# Patient Record
Sex: FEMALE | Race: WHITE | NOT HISPANIC OR LATINO | Employment: STUDENT | URBAN - METROPOLITAN AREA
[De-identification: names, ages, dates, MRNs, and addresses within clinical notes are randomized per-mention and may not be internally consistent; named-entity substitution may affect disease eponyms.]

---
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
## 2024-01-01 ENCOUNTER — TELEPHONE (OUTPATIENT)
Dept: ALLERGY | Facility: HOSPITAL | Age: 19
End: 2024-01-01
Payer: COMMERCIAL

## 2024-01-01 NOTE — TELEPHONE ENCOUNTER
RESULT INTERPRETATION NOTE  Emily's EBV DNA PCR is ow negative in both plasma and whole blood, so signs of clearing it, as previously positive for both in August 2023. Her IgG level continues to slowly drop, now down to 584 mg/dL, which again qualifies as hypogammaglobulinemia. If she continues having recurrent sinus or other infections we should re-discuss starting Emily on a trial of immunoglobulin (subcutaneous or IV) to assess her response and possible improvement. Same considerations as before for possible genetic testing.    Will communicate these results and interpretation with patient/family, through either [employer] message, telephone call, and/or by scheduling a follow-up visit to review these in detail.    (some labs below ordered by other specialties, A/I labs sent by us interpreted above)  Recent results  Lab on 12/28/2023   Component Date Value Ref Range Status    EBV DNA Result 12/28/2023 Not Detected  Not Detected Final    EBV PCR Plasma Log 12/28/2023    Final    EBV PCR Whole Blood LOG IU/mL 12/28/2023    Final    EBV Whole Blood DNA Result 12/28/2023 Not Detected  Not Detected Final    IgG 12/28/2023 584 (L)  700 - 1,600 mg/dL Final    IgA 12/28/2023 104  70 - 400 mg/dL Final    IgM 12/28/2023 60  40 - 230 mg/dL Final    Thyroid Stimulating Hormone 12/28/2023 1.10  0.44 - 3.98 mIU/L Final    Thyroxine, Free 12/28/2023 0.97  0.61 - 1.12 ng/dL Final    Thyroid Peroxidase (TPO) Antibody 12/28/2023 >1,000 (H)  <=60 IU/mL Final    Anti-Thyroglobulin AB 12/28/2023 <0.9  0.0 - 4.0 IU/mL Final

## 2024-01-12 ENCOUNTER — OFFICE VISIT (OUTPATIENT)
Dept: PEDIATRIC ENDOCRINOLOGY | Facility: CLINIC | Age: 19
End: 2024-01-12
Payer: COMMERCIAL

## 2024-01-12 VITALS
WEIGHT: 108.47 LBS | OXYGEN SATURATION: 98 % | TEMPERATURE: 98.1 F | RESPIRATION RATE: 19 BRPM | BODY MASS INDEX: 18.52 KG/M2 | DIASTOLIC BLOOD PRESSURE: 82 MMHG | SYSTOLIC BLOOD PRESSURE: 119 MMHG | HEART RATE: 86 BPM | HEIGHT: 64 IN

## 2024-01-12 DIAGNOSIS — E06.3 HASHIMOTO'S THYROIDITIS: Primary | ICD-10-CM

## 2024-01-12 PROCEDURE — 99214 OFFICE O/P EST MOD 30 MIN: CPT | Performed by: PEDIATRICS

## 2024-01-12 ASSESSMENT — ENCOUNTER SYMPTOMS
APPETITE CHANGE: 1
POLYDIPSIA: 0
HEADACHES: 1
POLYPHAGIA: 0
CONSTIPATION: 1

## 2024-01-12 NOTE — PATIENT INSTRUCTIONS
Emily thyroid nodule has resolved. Thyroid size is decreasing,   Continue selenomethionine at a dose of 200 mcg.        Plan:  1. Repeat thyroid ultrasound this summer  2. Please obtain labs this summer - please let me know before you get the lab so I can ask pathology to dilute it.  3. No need for thyroid hormone replacement therapy.     Follow-up this summer.

## 2024-01-12 NOTE — PROGRESS NOTES
Subjective   Emily Bains is a 18 y.o. female who presents for Hashimoto's Thyroiditis    HPI    Emily presents for follow-up endocrine evaluation for thyroid nodule. Last thyroid ultrasound in 10/22 with 0.4 cm nodule. Family reports prior thyroid US with no nodule in 6/20.     Emily has been noted to have TPO Ab elevation but negative TgAb. The ultrasound shows that thyroid less prominent. Emily still notes fullness of her thyroid. Emily has had TSH suppression in 10/19 at 0.2 and TSH elevation as high as 7.97 in 3/20. More recently TSH has been in 1 range, last in 10/22 and 12/2023. Emily was treated for short period of time with thyroid hormone supplement and that resulted in worsening of status and increased anxiety.     Emily is followed by naturopath, on low dose supplement lithium as well as many other supplements. She is gluten, dairy and egg free. She does not get iodinated salt at home but eats [employer] fries about once a week.     Emily with extreme fatigue. She has a history of apnea prior to tonsil and adenoid removal. Continue to feel like sleep does not result in feeling well rested.    She reports decrease in apetite  Worries a lot  - hard time to fall asleep. Gets 8hrs of sleep. Enjoyrs doing things. Good friends. Gets upset easily. Likes to read.   Off of SSRI. Sees counselor twice a week. Helps a bit.  Constipation. +acne - not had it before.   Periods are regular.        Initial evaluation was in 2019 - ?tytrophan TMG - +ve HHSV6 and high heavy metal. Lead is now to normal range.   On and off SSRI - not very successful   Migraine - tylenol or exedrin migraine.   Since 3/2020: gluten, egg, diary free   Folate, fish oil, tytrophan and mangesium      Hx of possible encephalitis - following w/ neurology at [facility]. Follow-up MRI in 5/2024.  Started Modafinil - starting 1/2 dose for 3 days then increase? .      Periods are regular. Denies polyuria/polydipsia      Studying marketing   Likes to  "read.      Lives with parents and brother.  Started selenium last time - taking it 30% of the time.     6.9 (R) 4.1 (L) --> thyroid [id number]    Review of Systems   Constitutional:  Positive for appetite change.   Gastrointestinal:  Positive for constipation.   Endocrine: Positive for cold intolerance. Negative for heat intolerance, polydipsia and polyphagia.   Neurological:  Positive for headaches.        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 Hashimoto's thyroitis was previously associated with mild right thyroid lobe enlargement and a subcentimeter (0.4 cm) mid-left thyroid nodule noted in 2020, which has since resolved. She has been taking selenomethionine 200 mcg daily since approximately August 2023, with observed improvement in right thyroid lobe enlargement. However, it is unclear whether this improvement is directly attributable to selenomethionine supplementation or occurred coincidentally over time.    Of note, Hashimoto's thyroiditis was initially diagnosed in 10/2019 when she was evaluated for anxiety and leg pain and was found to have transient hashitoxitosis with suppressed TSH, high fT3 and fT4, which subsequently normalize.     --> Repeat thyroid US and thyroid function tests this summer. We will likely discontinue selenium then.    --> Follow-up in 6mo    Patient " Instructions   Emily thyroid nodule has resolved. Thyroid size is decreasing,   Continue selenomethionine at a dose of 200 mcg.        Plan:  1. Repeat thyroid ultrasound this summer  2. Please obtain labs this summer - please let me know before you get the lab so I can ask pathology to dilute it.  3. No need for thyroid hormone replacement therapy.     Follow-up this summer.

## 2025-01-10 ENCOUNTER — LAB (OUTPATIENT)
Dept: LAB | Facility: LAB | Age: 20
End: 2025-01-10
Payer: COMMERCIAL

## 2025-01-10 ENCOUNTER — HOSPITAL ENCOUNTER (OUTPATIENT)
Dept: RADIOLOGY | Facility: HOSPITAL | Age: 20
Discharge: HOME | End: 2025-01-10
Payer: COMMERCIAL

## 2025-01-10 ENCOUNTER — APPOINTMENT (OUTPATIENT)
Dept: RADIOLOGY | Facility: CLINIC | Age: 20
End: 2025-01-10
Payer: COMMERCIAL

## 2025-01-10 DIAGNOSIS — E06.3 HASHIMOTO'S THYROIDITIS: ICD-10-CM

## 2025-01-10 LAB
T4 FREE SERPL-MCNC: 0.88 NG/DL (ref 0.61–1.12)
THYROPEROXIDASE AB SERPL-ACNC: >1000 IU/ML
TSH SERPL-ACNC: 1.14 MIU/L (ref 0.44–3.98)

## 2025-01-10 PROCEDURE — 84255 ASSAY OF SELENIUM: CPT

## 2025-01-10 PROCEDURE — 83036 HEMOGLOBIN GLYCOSYLATED A1C: CPT

## 2025-01-10 PROCEDURE — 76536 US EXAM OF HEAD AND NECK: CPT

## 2025-01-10 PROCEDURE — 86376 MICROSOMAL ANTIBODY EACH: CPT

## 2025-01-10 PROCEDURE — 86800 THYROGLOBULIN ANTIBODY: CPT

## 2025-01-10 PROCEDURE — 84439 ASSAY OF FREE THYROXINE: CPT

## 2025-01-10 PROCEDURE — 84443 ASSAY THYROID STIM HORMONE: CPT

## 2025-01-10 PROCEDURE — 36415 COLL VENOUS BLD VENIPUNCTURE: CPT

## 2025-01-11 LAB
EST. AVERAGE GLUCOSE BLD GHB EST-MCNC: 108 MG/DL
HBA1C MFR BLD: 5.4 %

## 2025-01-12 LAB
SELENIUM SERPL-MCNC: 145.6 UG/L (ref 23–190)
THYROGLOB AB SERPL-ACNC: <0.9 IU/ML (ref 0–4)

## 2025-04-08 ENCOUNTER — TELEPHONE (OUTPATIENT)
Dept: PEDIATRIC ENDOCRINOLOGY | Facility: CLINIC | Age: 20
End: 2025-04-08
Payer: COMMERCIAL

## 2025-04-08 NOTE — TELEPHONE ENCOUNTER
Called and LVM and kindly sent my chart messages.    Thyroid Ultrasound  The right thyroid lobe, which was previously enlarged in April 2023, is now normal in size.  Here are the detailed volume measurements over time:  4/2023: Right lobe - 8.2 mL  Left lobe - 4.4 mL ? Total: ~12.6 mL  12/2023: Right lobe - 6.9 mL  Left lobe - 4.1 mL ? Total: ~11 mL  1/2025: Right lobe - 5.5 mL  Left lobe - 3.5 mL ? Total: ~9 mL    Anti-TPO Antibody  With serial dilution, Emily's anti-TPO antibody level was measured at 1859 U/mL. It's difficult to say whether this represents an improvement or a stable trend compared to prior values. Antibody levels don't always predict thyroid function changes, but this does give us a helpful baseline moving forward     Not clear if Emily is still taking Selenium supplementation. If so, recommend we discontinue Selenium as most of its benefit is observed in the first 6mo with some studies extending its use to 18m.   Reassuring that selenium level in 1/2025 was normal.   --> repeat TFTs in thyroid US in 6mo.

## 2025-06-02 ENCOUNTER — PATIENT MESSAGE (OUTPATIENT)
Dept: ALLERGY | Facility: CLINIC | Age: 20
End: 2025-06-02
Payer: COMMERCIAL

## 2025-06-02 DIAGNOSIS — B99.9 RECURRENT INFECTIONS: Primary | ICD-10-CM

## 2025-06-02 DIAGNOSIS — B27.00 EBV (EPSTEIN-BARR VIRUS) VIREMIA: ICD-10-CM

## 2025-06-03 ENCOUNTER — TELEPHONE (OUTPATIENT)
Dept: ALLERGY | Facility: CLINIC | Age: 20
End: 2025-06-03
Payer: COMMERCIAL

## 2025-06-13 ENCOUNTER — LAB (OUTPATIENT)
Dept: LAB | Facility: HOSPITAL | Age: 20
End: 2025-06-13
Payer: COMMERCIAL

## 2025-06-13 LAB
BASOPHILS # BLD AUTO: 0.03 X10*3/UL (ref 0–0.1)
BASOPHILS NFR BLD AUTO: 0.6 %
EOSINOPHIL # BLD AUTO: 0.06 X10*3/UL (ref 0–0.7)
EOSINOPHIL NFR BLD AUTO: 1.2 %
ERYTHROCYTE [DISTWIDTH] IN BLOOD BY AUTOMATED COUNT: 11.5 % (ref 11.5–14.5)
HCT VFR BLD AUTO: 39.2 % (ref 36–46)
HGB BLD-MCNC: 13.5 G/DL (ref 12–16)
IMM GRANULOCYTES # BLD AUTO: 0.01 X10*3/UL (ref 0–0.7)
IMM GRANULOCYTES NFR BLD AUTO: 0.2 % (ref 0–0.9)
LYMPHOCYTES # BLD AUTO: 1.63 X10*3/UL (ref 1.2–4.8)
LYMPHOCYTES NFR BLD AUTO: 31.5 %
MCH RBC QN AUTO: 29.8 PG (ref 26–34)
MCHC RBC AUTO-ENTMCNC: 34.4 G/DL (ref 32–36)
MCV RBC AUTO: 87 FL (ref 80–100)
MONOCYTES # BLD AUTO: 0.3 X10*3/UL (ref 0.1–1)
MONOCYTES NFR BLD AUTO: 5.8 %
NEUTROPHILS # BLD AUTO: 3.15 X10*3/UL (ref 1.2–7.7)
NEUTROPHILS NFR BLD AUTO: 60.7 %
NRBC BLD-RTO: 0 /100 WBCS (ref 0–0)
PLATELET # BLD AUTO: 216 X10*3/UL (ref 150–450)
RBC # BLD AUTO: 4.53 X10*6/UL (ref 4–5.2)
WBC # BLD AUTO: 5.2 X10*3/UL (ref 4.4–11.3)

## 2025-06-13 PROCEDURE — 85025 COMPLETE CBC W/AUTO DIFF WBC: CPT

## 2025-06-15 LAB
BASOPHILS # BLD AUTO: 31 CELLS/UL (ref 0–200)
BASOPHILS NFR BLD AUTO: 0.6 %
EBV DNA SERPL NAA+PROBE-ACNC: NOT DETECTED IU/ML
EOSINOPHIL # BLD AUTO: 68 CELLS/UL (ref 15–500)
EOSINOPHIL NFR BLD AUTO: 1.3 %
ERYTHROCYTE [DISTWIDTH] IN BLOOD BY AUTOMATED COUNT: 11.4 % (ref 11–15)
HCT VFR BLD AUTO: 41.1 % (ref 35–45)
HGB BLD-MCNC: 13.3 G/DL (ref 11.7–15.5)
IGA SERPL-MCNC: NORMAL MG/DL
IGG SERPL-MCNC: NORMAL MG/DL
IGM SERPL-MCNC: NORMAL MG/DL
LYMPHOCYTES # BLD AUTO: 1565 CELLS/UL (ref 850–3900)
LYMPHOCYTES NFR BLD AUTO: 30.1 %
MCH RBC QN AUTO: 28.7 PG (ref 27–33)
MCHC RBC AUTO-ENTMCNC: 32.4 G/DL (ref 32–36)
MCV RBC AUTO: 88.8 FL (ref 80–100)
MONOCYTES # BLD AUTO: 333 CELLS/UL (ref 200–950)
MONOCYTES NFR BLD AUTO: 6.4 %
NEUTROPHILS # BLD AUTO: 3203 CELLS/UL (ref 1500–7800)
NEUTROPHILS NFR BLD AUTO: 61.6 %
PLATELET # BLD AUTO: 222 THOUSAND/UL (ref 140–400)
PMV BLD REES-ECKER: 12.2 FL (ref 7.5–12.5)
QUEST EBV DNA, QN PCR: NOT DETECTED LOG IU/ML
RBC # BLD AUTO: 4.63 MILLION/UL (ref 3.8–5.1)
WBC # BLD AUTO: 5.2 THOUSAND/UL (ref 3.8–10.8)

## 2025-06-16 LAB
BASOPHILS # BLD AUTO: 31 CELLS/UL (ref 0–200)
BASOPHILS NFR BLD AUTO: 0.6 %
CD19 CELLS # BLD: 0.18 X10E9/L (ref 0.07–0.91)
CD19 CELLS NFR BLD: 11 % (ref 6–19)
CD19+CD24++CD38++%: 5.7 % (ref 3–5.9)
CD19+CD24-CD38++%: 0.3 % (ref 0.6–1.6)
CD19+CD27+IGD+%: 5.1 % (ref 7.4–13.9)
CD19+CD27+IGD-%: 6 % (ref 7.2–12.7)
CD19+CD27-IGD+%: 85.3 % (ref 65.6–79.6)
EBV DNA BLD NAA+PROBE-LOG IU: NORMAL {LOG_IU}/ML
EBV DNA SERPL NAA+PROBE-ACNC: NOT DETECTED IU/ML
EOSINOPHIL # BLD AUTO: 68 CELLS/UL (ref 15–500)
EOSINOPHIL NFR BLD AUTO: 1.3 %
ERYTHROCYTE [DISTWIDTH] IN BLOOD BY AUTOMATED COUNT: 11.4 % (ref 11–15)
FLOW CYTOMETRY SPECIALIST REVIEW: ABNORMAL
HCT VFR BLD AUTO: 41.1 % (ref 35–45)
HGB BLD-MCNC: 13.3 G/DL (ref 11.7–15.5)
IGA SERPL-MCNC: 100 MG/DL (ref 47–310)
IGG SERPL-MCNC: 630 MG/DL (ref 600–1640)
IGM SERPL-MCNC: 81 MG/DL (ref 50–300)
LABORATORY COMMENT REPORT: NOT DETECTED
LYMPHOCYTES # BLD AUTO: 1565 CELLS/UL (ref 850–3900)
LYMPHOCYTES # SPEC AUTO: 1.63 X10*3/UL
LYMPHOCYTES NFR BLD AUTO: 30.1 %
MCH RBC QN AUTO: 28.7 PG (ref 27–33)
MCHC RBC AUTO-ENTMCNC: 32.4 G/DL (ref 32–36)
MCV RBC AUTO: 88.8 FL (ref 80–100)
MONOCYTES # BLD AUTO: 333 CELLS/UL (ref 200–950)
MONOCYTES NFR BLD AUTO: 6.4 %
NEUTROPHILS # BLD AUTO: 3203 CELLS/UL (ref 1500–7800)
NEUTROPHILS NFR BLD AUTO: 61.6 %
PATH REVIEW, B CELL PHENOTYPING, EXTENDED: ABNORMAL
PLATELET # BLD AUTO: 222 THOUSAND/UL (ref 140–400)
PMV BLD REES-ECKER: 12.2 FL (ref 7.5–12.5)
QUEST EBV DNA, QN PCR: NOT DETECTED LOG IU/ML
RBC # BLD AUTO: 4.63 MILLION/UL (ref 3.8–5.1)
WBC # BLD AUTO: 5.2 THOUSAND/UL (ref 3.8–10.8)

## 2025-06-26 NOTE — PROGRESS NOTES
PREFERRED CONTACT INFORMATION  Telephone: [phone]   Email: [email or web address]     HISTORY OF PRESENT ILLNESS  Emily Bains is a 19 y.o. female with PMH of recurrent sinorespiratory infections, hypogammaglobulinemia, Hashimoto's thyroiditis, history of encephalitis, reactive airway disease, recent mononucleosis, EBV viremia, who presents today for a virtual follow-up visit. she presents today accompanied by her mother, who also provide(s) history.    Interim history  - Last seen in 8/2023, at the time Emily was hypogammaglobulinemic, with discussion of possible IGRT trial, genetic testing, and/or antibiotic prophylaxis with Bactrim if repeat sinus infections. Was kept on PRN Symbicort (SMART), has not been using as of later, but continues having respiratory symptoms. In 10/2023 had return of mono-like symptoms, with labs repeated showing Emily's EBV DNA PCR to now be negative in both plasma and whole blood, so signs of clearing it, as previously positive for both in 8/2023. Her IgG level continued to slowly drop, then down to 584 mg/dL, which again lead to re-discuss possible supplementation. Emily had repeat labs in 3/2025 with normalization of IgG and normal IgA and IgM and recent repeat labs again showing normal CBC w/ diff, IgG, IgA, and IgM, with repeat EBV DNA PCR WB and plasma still negative. B cell phenotyping with mildly low switched memory B cells, but no functional signs of impairing IgG production, based on levels.  - Since then she got sick in February, had Flu, had a bad sinus infection, got SARS-CoV-2 in the Fall of 2024 as well, with some difficulty in controlling her thyroid symptoms, pending follow up visit with Endocrinology.    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 Also has goiter, with several thyroid nodules, not yet established with Endocrinology. Sees a functional  "medicine doctor where she receives several treatments, including UV light treatment of her blood to remove toxins, that recently tested her immunoglobulins and relayed that one Piper's IgG levels was low.  - Two years ago had Ig levels checked and told on the lower side of the range (IgG 777 mg/dL), recently re-checked again (IgG 706 mg/dL, subclasses with mild reduction of IgG4 5.8 (11.0-157), other subclasses normal, normal IgA and IgM levels, same for IgE.  - 1/2023: \"Labs sent on initial visit had CBC and CMP without major abnormalities. CH50 not sent by the lab. IgG borderline low (true low is below 600 mg/dL), with otherwise normal serum IgM, IgA, and IgE (negative environmental IgE so no signs of allergies). Positive tetanus titer, equivocal for measles, negative for meningococcal, pneumococcal serotypes 7/23, not atypical for PIPER's age but in general would recommend that PIPER receives a Pneumovax-23 vaccine at Her PCP or with us, and family should please let us know when she receives it so we can plan to repeat pneumococcal serotypes 4 to 6 weeks later - will discuss this further with Piper and family as she has not been getting vaccines since 2018. Normal lymphocyte subsets (CD3, CD4, CD8, NK, and B cells) with mild increase in DNT cells due to gamma delta (alpha beta 1.9%), an unspecific finding. B cell phenotyping with normal switched memory B cells and normal lymphocyte stimulation to mitogens. On last visit we started Piper on Symbicort 80/4.5 2 puffs PRN for wheezing/SOB, but has not been able to confirm with the company if fluoride is used in the inhaler production. Additionally she saw Dr. Walden (Peds Rheum) with negative evaluation for SLE and referral to see Neurology due to her diagnosis of autoimmune encephalitis.\"  - 8/2023: \"Since last visit in 1/2023 Piper was re-diagnosed with mononucleosis due to a positive EBV antibody panel, though family did not notice major changes in her " "baseline symptoms, except for fatigue the weeks before that. In light to that we added EBV testing to her planned monitoring labs. Her IgG is lower than when previously checked, now at 591, with normal IgA and IgM, normal total complement, negative measles titer (checked because equivocal last time), normal vitamin D. EBV PCR in plasma and whole blood is positive, but on the lower end, so possible that it is in process of resolution. We planned to further discuss next steps and options - if continuing downtrend of IgG Emily may require IGRT, should monitor EBV levels in 2-3 months, and will highly encouraging obtaining genetic testing (with her presentation we could get her Xendo testing).  She saw [facility]rology at [facility] (Dr. 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 Also had negative autoantibody testing and told CSF testing would be low yield. Planned to repeat MRI brain in 6 months from original one. She is going to college at , in [place], this Friday.\"    History of infections   Sinusitis: recurrent episodes of nasal congestion, previous T+A, had RACHAEL, several courses of antibiotics. Has not received an antibiotic since 9 until recently, has had sinus issues since she was 4 y.o., no associated drainage, tried fexofenadine, now on Augmentin for 8 weeks, no major difference needed, tried Rhinocort as well, no certain. SARS-CoV-2 since 12/2021. Used prednisone 20 mg BID for 5 days, finished 2/3 weeks ago.   Bronchitis: several   Pneumonia: no, SOB, with wheezing, just received IVF in the ED, ruled out PE/pneumonia   Otitis: recurrent ear infections, improved as she grew up   Skin and Soft Tissue: has had lots of warts growing up   Gastrointestinal: constipation, no diarrhea or blood in the stool     Labs  6/2025  - " CBC - WBC 5.2, Hb 13.3, Plt 222, ANC 3203, ALC 1565, AEC 68  - IgG 630, IgA 100, IgM 81  - B cell phenotyping - mildly low switched memory B cells 6% (7.2-12.7)  - EBV DNA PCR whole blood - negative  - EBV DNA PCR plasma - negative     3/2025  - IgG 646, IgA 96, IgM 74  - IgE 18     2023  - IgG 584 low, IgA 104, IgM 60  - EBV DNA PCR whole blood - negative  - EBV DNA PCR plasma - negative     2023  - EBV PCR plasma - negative  - IgG 636 borderline, improved, IgA 107, IgM 75  - Resp viral PCR  - negative     2023  - CH50 - normal  - EBV PCR plasma 158 low positive, EBV PCR  low positive  - IgG 591 low (700-1600), IgA 97, IgM 61  - Measles - negative  - Vit D - normal     2023  - EBV IgG positive, CAROLINE negative, EBV-NA IgG positive, VCA IgM positive (recent infection?)     2022  - CBC - normal  - CMP - normal  - IgG 646 borderline low (700-1600), IgA 108, IgM 53  - IgE 17  - Tetanus - positive  - Measles - equivocal  - Meningococcal serotypes - negative for A, C, Y, and W-135  - Pneumococcal serotypes 7/23  - CD3 1640, CD4 860, CD8 560, , B 140, mild increase in DNT cells due to gamma delta (alpha beta 1.9%)  - B cell phenotyping - normal switched memory B cells 6.6%  - Mitogens - normal  - Environmental IgE - negative      Immunizations  Uptodate? 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 , emergency, 40 weeks  Where was the infant born?: [facility], [place]    FAMILY HISTORY  Mom has recurrent sino-respiratory infections, brother does as well.  MGF had Guillain-Dillon (subacute), maternal aunt has Grave's disease, mom had cutaneous lupus (but has negative CRUZITO).     SOCIAL  HISTORY  Home: Lives in a house with parents  Floors: Wood with carpet  Air Conditioning: Central  Smokers: None  Pets: Dog (1)  Infestations: None  School: Going to College in [place], [place], to study business    ALLERGIES  Allergies[1]    MEDICATIONS  Medications Ordered Prior to Encounter[2]    REVIEW OF SYSTEMS  Pertinent positives and negatives have been assessed in the HPI. All other systems have been reviewed and are negative except as noted in the HPI.    PHYSICAL EXAMINATION   There were no vitals taken for this visit.    Constitutional: no acute distress and well developed  Ears/Nose/Mouth/Throat: oropharynx pink and moist, anicteric bilaterally  Respiratory: normal respiratory effort  Neuro: awake, alert, answers appropriately    ASSESSMENT & PLAN  Emily Bains is a 19 y.o. female with PMH of recurrent sinorespiratory infections, hypogammaglobulinemia, Hashimoto's thyroiditis, history of encephalitis, reactive airway disease, recent mononucleosis, EBV viremia, who presents today for a virtual follow-up visit.    1. Recurrent infections / EBV (Elza-Barr virus) viremia / Mild intermittent reactive airway disease / Hashimoto's thyroiditis / History of encephalitis  Emily has had a complex presentation without a clearly unifying diagnosis yet. She has been diagnosed on PANS based on imaging and had prior imaging compatible with an encephalitic presentation as well, since then repeat imaging at [facility] in 2023 without signs of inflammation, with incidental frontal mass, undergoing monitoring with Neurology at [facility]. She has an history of recurrent sino-respiratory infections that could have had a component of environmental allergies behind it, but had negative environmental testing and, based on her symptoms, also with common warts, and then with repeat mononucleosis and EBV viremia, an inborn error of immunity is in the differential and an immune work-up was warranted. Emily had previously had Ig levels  collected, with her total IgG above 600 mg/dL, but was then falling below that level, with IgA and IgM still normal. Her antibody studies showed mixed responses to protein and pneumococcal saccharide antigens, but she had not received immunizations for a long period. Her lymphocyte subsets were normal, except for mild elevation of gamma delta DNT cells, an unspecific marker of inflammation, that have since normalized. B cell phenotyping showed normal switched memory B cells and she had normal lymphocyte proliferation to mitogens. Her history of vasculitis and joint pathology points to a possible autoimmune process, but autoimmune evaluation with Dr. Walden did not identify a specific autoimmune diagnosis. Last seen in 8/2023, at the time Emily was hypogammaglobulinemic, with discussion of possible IGRT trial, genetic testing, and/or antibiotic prophylaxis with Bactrim if repeat sinus infections. Was kept on PRN Symbicort (SMART), has not been using as of later. In 10/2023 had return of mono-like symptoms, with labs repeated showing Emily's EBV DNA PCR to now be negative in both plasma and whole blood, so signs of clearing it, as previously positive for both in 8/2023. Her IgG level continued to slowly drop, then down to 584 mg/dL, which again lead to re-discuss possible supplementation. Emily had repeat labs in 3/2025 with normalization of IgG and normal IgA and IgM and recent repeat labs again showing normal CBC w/ diff, IgG, IgA, and IgM, with repeat EBV DNA PCR WB and plasma still negative. B cell phenotyping with mildly low switched memory B cells, but no functional signs of impairing IgG production, based on levels.  - Currently without indication for IGRT, as serum IgG levels recovered, but given past history and mild reduction of switched memory B cells, will plan to repeat levels in around 6 months, with orders placed today as below.  - Will prescribe Symbicort (budesonide/formoterol) 80/4.5 to use 2  puffs twice a day, and can use the same inhaler - 2 extra puffs - if still having symptoms, up to a maximum of 12 puffs per day.   - Discussed with the existence of a NGS panel to look at potential inborn errors of immunity. We discussed the potential benefits of identifying a genetic mutation that could explain PIPER's immunophenotype and infections, but also that in a large portion of cases we cannot find a specific mutation that explains Her presentation. Additionally, it is very common to find several variants on each individual, that have no defined significance when in isolation, but than can have importance in terms of future risk of transmission of specific immune diseases to PIPER's descendants. Patient and family not currently interested in pursuing this test, but will let us know if this changes and we can re-discuss at future visits if interested.   - We can also potentially trial antibiotic prophylaxis with Bactrim if Piper continues to have sinus infections in the setting of her current findings.   - CBC and Auto Differential; Future  - B Cell Phenotyping, Extended, Panel; Future  - Immunodeficiency Profile Panel; Future  - Immunoglobulins (IgG, IgA, IgM); Future  - budesonide-formoterol (Symbicort) 80-4.5 mcg/actuation inhaler; Inhale 2 puffs 2 times a day. May use sets of extra 2 puffs up to a total maximum of 8 puffs per day. Rinse mouth with water after use to reduce aftertaste and incidence of candidiasis. Do not swallow.  Dispense: 10.2 g; Refill: 1    Follow-up visit is recommended in 6-9 months.    Abraham Stern MD     This visit was completed via audio and visual technology. All issues as below were discussed and addressed and a limited physical exam within the constraints of the technology was performed. If it was felt that the patient should be evaluated in clinic then they were directed there. Verbal consent was requested and obtained from patient to provide this telehealth  service on this date for a telehealth visit.               [1]   Allergies  Allergen Reactions    Adhesive Tape-Silicones Rash    Chlorpheniramine-Pe Tannates Other    Metoclopramide Unknown    Phenylephrine Unknown   [2]   Current Outpatient Medications on File Prior to Visit   Medication Sig Dispense Refill    budesonide (Rhinocort AQ) 32 mcg/actuation nasal spray Administer 2 sprays into affected nostril(s) once daily.      cetirizine (ZyrTEC) 10 mg tablet Take 1 tablet (10 mg) by mouth once daily as needed.      DOCOSAHEXAENOIC ACID ORAL Take 1 tablet by mouth once daily.      escitalopram (Lexapro) 5 mg tablet Take 1 tablet (5 mg) by mouth once daily.      fexofenadine-pseudoephedrine (Allegra-D 24) 180-240 mg 24 hr tablet Take 1 tablet by mouth once daily as needed for allergies (congestion).      LITHIUM CITRATE ORAL Take 10 mg by mouth twice a day.      magnesium citrate, bulk, powder Magnesium Citrate POWD   Refills: 0       Active      magnesium glycinate-mag oxide 120 mg magnesium capsule Take by mouth.      miconazole (Micotin) 2 % cream Apply topically twice a day.  APPLY SPARINGLY TO AFFECTED AREA(S)      modafinil (Provigil) 200 mg tablet Take 1 tablet (200 mg) by mouth once daily.      nystatin (Mycostatin) cream Apply topically 2 times a day. APPLY AND RUB IN A THIN FILM TO AFFECTED AREAS       No current facility-administered medications on file prior to visit.

## 2025-06-27 ENCOUNTER — APPOINTMENT (OUTPATIENT)
Dept: ALLERGY | Facility: CLINIC | Age: 20
End: 2025-06-27
Payer: COMMERCIAL

## 2025-06-27 DIAGNOSIS — E06.3 HASHIMOTO'S THYROIDITIS: ICD-10-CM

## 2025-06-27 DIAGNOSIS — J45.20 MILD INTERMITTENT REACTIVE AIRWAY DISEASE WITHOUT COMPLICATION (HHS-HCC): ICD-10-CM

## 2025-06-27 DIAGNOSIS — B99.9 RECURRENT INFECTIONS: Primary | ICD-10-CM

## 2025-06-27 DIAGNOSIS — B27.00 EBV (EPSTEIN-BARR VIRUS) VIREMIA: ICD-10-CM

## 2025-06-27 DIAGNOSIS — Z86.61 HISTORY OF ENCEPHALITIS: ICD-10-CM

## 2025-06-27 PROCEDURE — 1036F TOBACCO NON-USER: CPT | Performed by: STUDENT IN AN ORGANIZED HEALTH CARE EDUCATION/TRAINING PROGRAM

## 2025-06-27 PROCEDURE — 99215 OFFICE O/P EST HI 40 MIN: CPT | Performed by: STUDENT IN AN ORGANIZED HEALTH CARE EDUCATION/TRAINING PROGRAM

## 2025-06-27 RX ORDER — BUDESONIDE AND FORMOTEROL FUMARATE DIHYDRATE 80; 4.5 UG/1; UG/1
2 AEROSOL RESPIRATORY (INHALATION)
Qty: 10.2 G | Refills: 1 | Status: SHIPPED | OUTPATIENT
Start: 2025-06-27 | End: 2025-09-25

## 2025-06-27 NOTE — Clinical Note
Can you please send Emily/family the [facility] orders for the labs to obtain in 6 months (around mid-December)?  Thank you very much,  JAXSON

## 2025-06-27 NOTE — PATIENT INSTRUCTIONS
Thank you very much for visiting us today. For her respiratory symptoms, we are sending in for a Symbicort (budesonide/formoterol) 80/4.5 inhaler for Emily to use 2 puffs twice a day, and you can use the same inhaler - 2 extra puffs - if still having symptoms, up to a maximum of 12 puffs per day. We will plan to repeat a few of the immune labs, will send you the orders and would plan to obtain those around mid-December. As we discussed today, we can consider trying Bactrim prophylaxis to reduce Emily's rate of infections, especially during Fall/Winter season, though it would not protect against viral infections. We will plan to see Emily in 6-9 months, ok to be virtual (highly recommend scheduling the follow up as soon as you can to avoid schedule blocks), but please feel free to contact us through our office at [phone] and press 0 to talk with our  for any scheduling needs or [phone] to talk with our nursing team if you have any earlier or additional clinical needs. It was a pleasure caring for Emily today!    ==============================

## 2025-07-22 ENCOUNTER — TELEPHONE (OUTPATIENT)
Dept: ALLERGY | Facility: HOSPITAL | Age: 20
End: 2025-07-22

## 2025-07-23 ENCOUNTER — APPOINTMENT (OUTPATIENT)
Dept: ALLERGY | Facility: CLINIC | Age: 20
End: 2025-07-23
Payer: COMMERCIAL